# Patient Record
Sex: MALE | Race: BLACK OR AFRICAN AMERICAN | Employment: UNEMPLOYED | URBAN - METROPOLITAN AREA
[De-identification: names, ages, dates, MRNs, and addresses within clinical notes are randomized per-mention and may not be internally consistent; named-entity substitution may affect disease eponyms.]

---

## 2017-01-04 ENCOUNTER — OFFICE VISIT (OUTPATIENT)
Dept: FAMILY MEDICINE CLINIC | Age: 52
End: 2017-01-04

## 2017-01-04 VITALS
RESPIRATION RATE: 16 BRPM | HEIGHT: 77 IN | WEIGHT: 223 LBS | BODY MASS INDEX: 26.33 KG/M2 | OXYGEN SATURATION: 99 % | TEMPERATURE: 95.6 F | DIASTOLIC BLOOD PRESSURE: 86 MMHG | HEART RATE: 81 BPM | SYSTOLIC BLOOD PRESSURE: 151 MMHG

## 2017-01-04 DIAGNOSIS — B19.20 HEPATITIS C VIRUS INFECTION WITHOUT HEPATIC COMA, UNSPECIFIED CHRONICITY: ICD-10-CM

## 2017-01-04 DIAGNOSIS — Z00.00 REGULAR CHECK-UP: Primary | ICD-10-CM

## 2017-01-04 DIAGNOSIS — M54.40 LOW BACK PAIN WITH SCIATICA, SCIATICA LATERALITY UNSPECIFIED, UNSPECIFIED BACK PAIN LATERALITY, UNSPECIFIED CHRONICITY: ICD-10-CM

## 2017-01-04 LAB — GLUCOSE POC: 143 MG/DL

## 2017-01-04 RX ORDER — CYCLOBENZAPRINE HCL 10 MG
10 TABLET ORAL
Qty: 20 TAB | Refills: 0 | Status: SHIPPED | OUTPATIENT
Start: 2017-01-04 | End: 2017-02-24 | Stop reason: CLARIF

## 2017-01-04 RX ORDER — NAPROXEN 500 MG/1
500 TABLET ORAL 2 TIMES DAILY WITH MEALS
Qty: 60 TAB | Refills: 1 | Status: SHIPPED | OUTPATIENT
Start: 2017-01-04 | End: 2017-02-24 | Stop reason: CLARIF

## 2017-01-04 NOTE — PROGRESS NOTES
Chief Complaint   Patient presents with    Follow-up     pain in the lower back     1. Have you been to the ER, urgent care clinic since your last visit? Hospitalized since your last visit? Yes When: 12/2016 Where: [facility] Reason for visit: back pain    2. Have you seen or consulted any other health care providers outside of the [facility] since your last visit? No    3. When was your last Mammogram, Pap smear, and/or Colon screening? Mammogram: Year: No Pap smear: year: No Colonoscopy: Year:No  PMH/FH/Social Hx reviewed and updated as needed      Applicable screenings reviewed and updated as needed  Medication reconciliation performed. Patient does need medication refills. Flexeril, Neurontin, Tramadol  Health Maintenance reviewed.

## 2017-01-04 NOTE — PROGRESS NOTES
HPI  Carole Gaytan is a 45 y.o. male being seen today for needs pain medication. Pt woke up this morning and fell upon standing up due to legs giving. No back pain. Pt called 911 this morning and had to be seen by ambulance and missed court date this morning. Chief Complaint   Patient presents with    Follow-up     pain in the lower back   . he states that he has no money and still has a lot of back pain. On last tramadol pill. Does have hepatitis. States it takes him 30 minutes to urinate sometimes. Past Medical History   Diagnosis Date    BPH (benign prostatic hypertrophy)     [facility] [address]     Heroin abuse 1996     Stopped 3/2016    Hypertension     Prostatitis     Sinus drainage     Sinusitis nasal          ROS  Patient states that he is feeling well. Denies complaints of chest pain, shortness of breath, swelling of legs, dizziness or weakness. he denies nausea, vomiting or diarrhea. Current Outpatient Prescriptions   Medication Sig    HYDROcodone-acetaminophen (NORCO) 5-325 mg per tablet Take 1-2 tablets PO every 4-6 hours as needed for pain control. If over the counter ibuprofen or acetaminophen was suggested, then only take the vicodin for pain not well controlled with the over the counter medication.  naproxen (NAPROSYN) 500 mg tablet Take 1 Tab by mouth two (2) times daily (with meals).  doxazosin (CARDURA) 4 mg tablet Take 1 Tab by mouth daily.  traMADol (ULTRAM) 50 mg tablet Take 1 Tab by mouth every six (6) hours as needed for Pain. Max Daily Amount: 200 mg.  aspirin 81 mg chewable tablet Take 1 Tab by mouth daily.  gabapentin (NEURONTIN) 300 mg capsule Take 1 Cap by mouth nightly as needed.  polyethylene glycol (MIRALAX) 17 gram/dose powder Take 17 g by mouth daily.  cyclobenzaprine (FLEXERIL) 5 mg tablet Take 2 Tabs by mouth three (3) times daily. No current facility-administered medications for this visit.         PE  Visit Vitals    /86 (BP 1 Location: Left arm, BP Patient Position: Sitting)    Pulse 81    Temp 95.6 °F (35.3 °C) (Oral)    Resp 16    Ht 6' 5\" (1.956 m)    Wt 223 lb (101.2 kg)    SpO2 99%    BMI 26.44 kg/m2        Alert and oriented with normal mood and affect. he is well developed and well nourished . Lungs are clear without wheezing. Heart rate is regular without murmurs or gallops. There is no lower extremity edema. 5/5 strength both LE    Results for orders placed or performed in visit on 01/04/17   AMB POC GLUCOSE BLOOD, BY GLUCOSE MONITORING DEVICE   Result Value Ref Range    Glucose  mg/dL         Assessment and Plan:        ICD-10-CM ICD-9-CM    1. Regular check-up Z00.00 V70.0 AMB POC GLUCOSE BLOOD, BY GLUCOSE MONITORING DEVICE   2.  Hepatitis C virus infection without hepatic coma, unspecified chronicity B19.20 070.70    reviewed CTabd/pelvis  APA information  MRI rescheduled and pt strongly encouraged to follow through to determine plan of care    Marjorie Busby NP

## 2017-01-04 NOTE — PROGRESS NOTES
Discussed with the patient MRI protocol, scheduled MRI appointment, and reviewed Care Card application process  Gave patient AVS    Discussed with the patient and all questioned fully answered. He will call me if any problems arise.

## 2017-01-04 NOTE — MR AVS SNAPSHOT
Visit Information Date & Time Provider Department Dept. Phone Encounter #  
 1/4/2017  1:15 PM Zoila Durant [phone] [id number] Upcoming Health Maintenance Date Due Hepatitis C Screening 1965 Pneumococcal 19-64 Medium Risk (1 of 1 - PPSV23) 8/30/1983 DTaP/Tdap/Td series (1 - Tdap) 8/30/1985 FOBT Q 1 YEAR AGE 50-75 8/30/2014 INFLUENZA AGE 9 TO ADULT 8/1/2016 Allergies as of 1/4/2017  Review Complete On: 1/4/2017 By: Gabriella Driver Severity Noted Reaction Type Reactions Pollen Extracts  04/30/2013    Runny Nose Patient denies any allergies Current Immunizations  Never Reviewed No immunizations on file. Not reviewed this visit You Were Diagnosed With   
  
 Codes Comments Regular check-up    -  Primary ICD-10-CM: Z00.00 ICD-9-CM: V70.0 Hepatitis C virus infection without hepatic coma, unspecified chronicity     ICD-10-CM: B19.20 ICD-9-CM: 070.70 Low back pain with sciatica, sciatica laterality unspecified, unspecified back pain laterality, unspecified chronicity     ICD-10-CM: M54.40 ICD-9-CM: 724.3 Vitals BP Pulse Temp Resp Height(growth percentile) Weight(growth percentile) 151/86 (BP 1 Location: Left arm, BP Patient Position: Sitting) 81 95.6 °F (35.3 °C) (Oral) 16 6' 5\" (1.956 m) 223 lb (101.2 kg) SpO2 BMI Smoking Status 99% 26.44 kg/m2 Current Every Day Smoker Vitals History BMI and BSA Data Body Mass Index Body Surface Area  
 26.44 kg/m 2 2.34 m 2 Preferred Pharmacy Pharmacy Name Phone [id number] - [address] [phone] Your Updated Medication List  
  
   
This list is accurate as of: 1/4/17  1:16 PM.  Always use your most recent med list.  
  
  
  
  
 aspirin 81 mg chewable tablet Take 1 Tab by mouth daily. cyclobenzaprine 10 mg tablet Commonly known as:  FLEXERIL  
 Take 1 Tab by mouth three (3) times daily as needed for Muscle Spasm(s). doxazosin 4 mg tablet Commonly known as:  CARDURA Take 1 Tab by mouth daily. gabapentin 300 mg capsule Commonly known as:  NEURONTIN Take 1 Cap by mouth nightly as needed. HYDROcodone-acetaminophen 5-325 mg per tablet Commonly known as:  Trudi President Take 1-2 tablets PO every 4-6 hours as needed for pain control. If over the counter ibuprofen or acetaminophen was suggested, then only take the vicodin for pain not well controlled with the over the counter medication. * naproxen 500 mg tablet Commonly known as:  NAPROSYN Take 1 Tab by mouth two (2) times daily (with meals). * naproxen 500 mg tablet Commonly known as:  NAPROSYN Take 1 Tab by mouth two (2) times daily (with meals). polyethylene glycol 17 gram/dose powder Commonly known as:  Leata Sans Take 17 g by mouth daily. traMADol 50 mg tablet Commonly known as:  ULTRAM  
Take 1 Tab by mouth every six (6) hours as needed for Pain. Max Daily Amount: 200 mg.  
  
 * Notice: This list has 2 medication(s) that are the same as other medications prescribed for you. Read the directions carefully, and ask your doctor or other care provider to review them with you. Prescriptions Sent to Pharmacy Refills  
 cyclobenzaprine (FLEXERIL) 10 mg tablet 0 Sig: Take 1 Tab by mouth three (3) times daily as needed for Muscle Spasm(s). Class: Normal  
 Pharmacy: [id number] Ph #: [phone] Route: Oral  
 naproxen (NAPROSYN) 500 mg tablet 1 Sig: Take 1 Tab by mouth two (2) times daily (with meals). Class: Normal  
 Pharmacy: [address] [facility] Ph #: [phone] Route: Oral  
  
To-Do List   
 01/12/2017 4:00 PM  
  Appointment with [facility] MRI RM 1 at [facility]  Nicola Jack ([phone]) GENERAL INSTRUCTIONS  Bring information (ID card) if you have any medically implanted devices. You will be required to lie still while the procedure is being performed. Remove any jewelry (including body piercing, hairpins) prior to MRI. If you have had a creatinine level drawn within the past 30 days, please bring most recent results to your appt. Bring any films, CD's, and reports related to your study with you on the day of your exam.  This only includes studies done outside of [address] [place] [address], [facility], [place] [address], and [facility] [place] [facility]. Bring a complete list of all medications you are currently taking to include prescriptions, over-the-counter meds, herbals, vitamins & any dietary supplements. If you were given medications for claustrophobia or anxiety, please arrange to have someone drive you to your appointment. QUESTIONS  Notify the MRI Department if you have any questions concerning your study. Aleida Tovar [phone] [address] [place] - [phone] Patient Instructions Call [employer] Patient [employer] at [phone]. They will screen you for any insurance you might qualify for. This is the first step before you can receive discounts at the hospital or [facility] specialists. Additional contact numbers for APA are below: For [place]/[facility] patients: [phone] For [place]/[facility] patients: [phone] For [place] patients: [phone] Introducing [facility]! Mike Wasserman introduces [employer] patient portal. Now you can access parts of your medical record, email your doctor's office, and request medication refills online. 1. In your internet browser, go to [email or web address] 2. Click on the First Time User? Click Here link in the Sign In box. You will see the New Member Sign Up page. 3. Enter your [employer] Access Code exactly as it appears below.  You will not need to use this code after youve completed the sign-up process. If you do not sign up before the expiration date, you must request a new code. · [employer] Access Code: [email or web address] Expires: 1/11/2017 12:29 AM 
 
4. Enter the last four digits of your Social Security Number (xxxx) and Date of Birth (mm/dd/yyyy) as indicated and click Submit. You will be taken to the next sign-up page. 5. Create a [employer] ID. This will be your [employer] login ID and cannot be changed, so think of one that is secure and easy to remember. 6. Create a [employer] password. You can change your password at any time. 7. Enter your Password Reset Question and Answer. This can be used at a later time if you forget your password. 8. Enter your e-mail address. You will receive e-mail notification when new information is available in [address] 9. Click Sign Up. You can now view and download portions of your medical record. 10. Click the Download Summary menu link to download a portable copy of your medical information. If you have questions, please visit the Frequently Asked Questions section of the [employer] website. Remember, [employer] is NOT to be used for urgent needs. For medical emergencies, dial 911. Now available from your iPhone and Android! Please provide this summary of care documentation to your next provider. Your primary care clinician is listed as NONE. If you have any questions after today's visit, please call [phone].

## 2017-01-04 NOTE — PATIENT INSTRUCTIONS
Call [employer] Patient [employer]acy at [phone]. They will screen you for any insurance you might qualify for. This is the first step before you can receive discounts at the hospital or [facility] specialists. Additional contact numbers for APA are below:   For [place] patients: [phone]  For [place]/[facility] patients: [phone]  For [place]/[facility] patients: [phone]

## 2017-01-12 ENCOUNTER — HOSPITAL ENCOUNTER (OUTPATIENT)
Dept: MRI IMAGING | Age: 52
Discharge: HOME | End: 2017-01-12
Attending: NURSE PRACTITIONER
Payer: SELF-PAY

## 2017-01-12 DIAGNOSIS — M54.50 CHRONIC LEFT-SIDED LOW BACK PAIN WITHOUT SCIATICA: ICD-10-CM

## 2017-01-12 DIAGNOSIS — G89.29 CHRONIC LEFT-SIDED LOW BACK PAIN WITHOUT SCIATICA: ICD-10-CM

## 2017-01-12 PROCEDURE — 72148 MRI LUMBAR SPINE W/O DYE: CPT

## 2017-02-06 ENCOUNTER — HOSPITAL ENCOUNTER (EMERGENCY)
Age: 52
Discharge: HOME | End: 2017-02-07
Attending: EMERGENCY MEDICINE
Payer: SELF-PAY

## 2017-02-06 ENCOUNTER — APPOINTMENT (OUTPATIENT)
Dept: GENERAL RADIOLOGY | Age: 52
End: 2017-02-06
Attending: EMERGENCY MEDICINE
Payer: SELF-PAY

## 2017-02-06 DIAGNOSIS — J20.8 ACUTE BRONCHITIS DUE TO OTHER SPECIFIED ORGANISMS: Primary | ICD-10-CM

## 2017-02-06 LAB
ANION GAP BLD CALC-SCNC: 9 MMOL/L (ref 3–18)
BASOPHILS # BLD AUTO: 0 K/UL (ref 0–0.1)
BASOPHILS # BLD: 0 % (ref 0–2)
BUN SERPL-MCNC: 8 MG/DL (ref 7–18)
BUN/CREAT SERPL: 11 (ref 12–20)
CALCIUM SERPL-MCNC: 9 MG/DL (ref 8.5–10.1)
CHLORIDE SERPL-SCNC: 99 MMOL/L (ref 100–108)
CK MB CFR SERPL CALC: 0.7 % (ref 0–4)
CK MB SERPL-MCNC: 1.1 NG/ML (ref 0.5–3.6)
CK SERPL-CCNC: 169 U/L (ref 39–308)
CO2 SERPL-SCNC: 27 MMOL/L (ref 21–32)
CREAT SERPL-MCNC: 0.74 MG/DL (ref 0.6–1.3)
DIFFERENTIAL METHOD BLD: ABNORMAL
EOSINOPHIL # BLD: 0 K/UL (ref 0–0.4)
EOSINOPHIL NFR BLD: 0 % (ref 0–5)
ERYTHROCYTE [DISTWIDTH] IN BLOOD BY AUTOMATED COUNT: 14 % (ref 11.6–14.5)
FLUAV AG NPH QL IA: NEGATIVE
FLUBV AG NOSE QL IA: NEGATIVE
GLUCOSE SERPL-MCNC: 151 MG/DL (ref 74–99)
HCT VFR BLD AUTO: 40.1 % (ref 36–48)
HGB BLD-MCNC: 13.4 G/DL (ref 13–16)
LACTATE BLD-SCNC: 0.9 MMOL/L (ref 0.4–2)
LYMPHOCYTES # BLD AUTO: 14 % (ref 21–52)
LYMPHOCYTES # BLD: 1.3 K/UL (ref 0.9–3.6)
MCH RBC QN AUTO: 28 PG (ref 24–34)
MCHC RBC AUTO-ENTMCNC: 33.4 G/DL (ref 31–37)
MCV RBC AUTO: 83.9 FL (ref 74–97)
MONOCYTES # BLD: 0.8 K/UL (ref 0.05–1.2)
MONOCYTES NFR BLD AUTO: 9 % (ref 3–10)
NEUTS SEG # BLD: 7 K/UL (ref 1.8–8)
NEUTS SEG NFR BLD AUTO: 77 % (ref 40–73)
PLATELET # BLD AUTO: 200 K/UL (ref 135–420)
PMV BLD AUTO: 10.3 FL (ref 9.2–11.8)
POTASSIUM SERPL-SCNC: 3.6 MMOL/L (ref 3.5–5.5)
RBC # BLD AUTO: 4.78 M/UL (ref 4.7–5.5)
SODIUM SERPL-SCNC: 135 MMOL/L (ref 136–145)
TROPONIN I SERPL-MCNC: <0.02 NG/ML (ref 0–0.04)
TSH SERPL DL<=0.05 MIU/L-ACNC: 0.33 UIU/ML (ref 0.36–3.74)
WBC # BLD AUTO: 9.2 K/UL (ref 4.6–13.2)

## 2017-02-06 PROCEDURE — 93005 ELECTROCARDIOGRAM TRACING: CPT

## 2017-02-06 PROCEDURE — 74011000250 HC RX REV CODE- 250: Performed by: EMERGENCY MEDICINE

## 2017-02-06 PROCEDURE — 77030012341 HC CHMB SPCR OPTC MDI VYRM -A

## 2017-02-06 PROCEDURE — 87040 BLOOD CULTURE FOR BACTERIA: CPT | Performed by: EMERGENCY MEDICINE

## 2017-02-06 PROCEDURE — 83605 ASSAY OF LACTIC ACID: CPT

## 2017-02-06 PROCEDURE — 71020 XR CHEST PA LAT: CPT

## 2017-02-06 PROCEDURE — 74011250637 HC RX REV CODE- 250/637: Performed by: EMERGENCY MEDICINE

## 2017-02-06 PROCEDURE — 94640 AIRWAY INHALATION TREATMENT: CPT

## 2017-02-06 PROCEDURE — 82550 ASSAY OF CK (CPK): CPT | Performed by: EMERGENCY MEDICINE

## 2017-02-06 PROCEDURE — 84443 ASSAY THYROID STIM HORMONE: CPT | Performed by: EMERGENCY MEDICINE

## 2017-02-06 PROCEDURE — 85025 COMPLETE CBC W/AUTO DIFF WBC: CPT | Performed by: EMERGENCY MEDICINE

## 2017-02-06 PROCEDURE — 99284 EMERGENCY DEPT VISIT MOD MDM: CPT

## 2017-02-06 PROCEDURE — 80048 BASIC METABOLIC PNL TOTAL CA: CPT | Performed by: EMERGENCY MEDICINE

## 2017-02-06 PROCEDURE — 87804 INFLUENZA ASSAY W/OPTIC: CPT | Performed by: EMERGENCY MEDICINE

## 2017-02-06 RX ORDER — CODEINE PHOSPHATE AND GUAIFENESIN 10; 100 MG/5ML; MG/5ML
10 SOLUTION ORAL
Status: COMPLETED | OUTPATIENT
Start: 2017-02-06 | End: 2017-02-06

## 2017-02-06 RX ORDER — ACETAMINOPHEN 500 MG
500 TABLET ORAL
Status: COMPLETED | OUTPATIENT
Start: 2017-02-06 | End: 2017-02-06

## 2017-02-06 RX ORDER — ALBUTEROL SULFATE 90 UG/1
2 AEROSOL, METERED RESPIRATORY (INHALATION)
Status: DISCONTINUED | OUTPATIENT
Start: 2017-02-06 | End: 2017-02-07 | Stop reason: HOSPADM

## 2017-02-06 RX ORDER — CODEINE PHOSPHATE AND GUAIFENESIN 10; 100 MG/5ML; MG/5ML
10 SOLUTION ORAL
Qty: 180 ML | Refills: 0 | Status: SHIPPED | OUTPATIENT
Start: 2017-02-06 | End: 2017-02-24 | Stop reason: CLARIF

## 2017-02-06 RX ORDER — PREDNISONE 20 MG/1
60 TABLET ORAL
Status: COMPLETED | OUTPATIENT
Start: 2017-02-06 | End: 2017-02-07

## 2017-02-06 RX ORDER — PREDNISONE 50 MG/1
50 TABLET ORAL DAILY
Qty: 5 TAB | Refills: 0 | Status: SHIPPED | OUTPATIENT
Start: 2017-02-06 | End: 2017-02-11

## 2017-02-06 RX ADMIN — GUAIFENESIN AND CODEINE PHOSPHATE 10 ML: 100; 10 SOLUTION ORAL at 23:01

## 2017-02-06 RX ADMIN — ACETAMINOPHEN 500 MG: 500 TABLET, FILM COATED ORAL at 23:01

## 2017-02-06 RX ADMIN — FLUORESCEIN SODIUM 1 STRIP: 1 STRIP OPHTHALMIC at 23:01

## 2017-02-07 VITALS
HEART RATE: 89 BPM | SYSTOLIC BLOOD PRESSURE: 140 MMHG | TEMPERATURE: 99.9 F | RESPIRATION RATE: 18 BRPM | OXYGEN SATURATION: 96 % | WEIGHT: 220 LBS | HEIGHT: 77 IN | BODY MASS INDEX: 25.98 KG/M2 | DIASTOLIC BLOOD PRESSURE: 88 MMHG

## 2017-02-07 LAB
ATRIAL RATE: 99 BPM
CALCULATED P AXIS, ECG09: 64 DEGREES
CALCULATED R AXIS, ECG10: 42 DEGREES
CALCULATED T AXIS, ECG11: 51 DEGREES
DIAGNOSIS, 93000: NORMAL
P-R INTERVAL, ECG05: 152 MS
Q-T INTERVAL, ECG07: 328 MS
QRS DURATION, ECG06: 80 MS
QTC CALCULATION (BEZET), ECG08: 420 MS
VENTRICULAR RATE, ECG03: 99 BPM

## 2017-02-07 PROCEDURE — 74011250637 HC RX REV CODE- 250/637: Performed by: EMERGENCY MEDICINE

## 2017-02-07 PROCEDURE — 74011636637 HC RX REV CODE- 636/637: Performed by: EMERGENCY MEDICINE

## 2017-02-07 RX ADMIN — ALBUTEROL SULFATE 2 PUFF: 90 AEROSOL, METERED RESPIRATORY (INHALATION) at 00:29

## 2017-02-07 RX ADMIN — PREDNISONE 60 MG: 20 TABLET ORAL at 00:28

## 2017-02-07 NOTE — ED PROVIDER NOTES
HPI Comments: 9:15 PM Mary Small is a 45 y.o. male occasional smoker, [facility], with a hx of chronic back pain, HTN, prostatitis, and diabetes who presents to the ED c/o chest congestion that started yesterday but worsened this morning. He notes associated chills, headache, generalized weakness, and a productive cough with a yellow sputum. He has not checked his temperature. He has been taking OTC Tussin with no relief. He took a left over Vicodin yesterday with no relief. He does report a hx of asthma as a child, but it went away as he grew up. 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 The pt denies abdominal pain, N/V/D, shortness of breath, and any further complaints. The history is provided by the patient. Past Medical History:   Diagnosis Date    BPH (benign prostatic hypertrophy)     Diabetes ([facility] [address]     Heroin abuse 1996     Stopped 3/2016    Hypertension     Prostatitis     Sinus drainage     Sinusitis nasal        History reviewed. No pertinent past surgical history. Family History:   Problem Relation Age of Onset    Heart Disease Mother     Hypertension Mother     Diabetes Father     Cancer Father     Hypertension Sister        Social History     Social History    Marital status: LEGALLY      Spouse name: N/A    Number of children: N/A    Years of education: N/A     Occupational History    Not on file.      Social History Main Topics    Smoking status: Current Every Day Smoker     Packs/day: 0.50     Years: 20.00     Types: Cigarettes    Smokeless tobacco: Never Used      Comment: 4 cigarettes a day    Alcohol use 1.5 oz/week     3 Cans of beer per week      Comment: occassiionally    Drug use: No      Comment: last use 3/2016- entered drug program    Sexual activity: Yes     Partners: Female     Birth control/ protection: None     Other Topics Concern    Not on file     Social History Narrative ALLERGIES: Pollen extracts    Review of Systems   Constitutional: Positive for chills. Negative for activity change, appetite change and diaphoresis. HENT: Positive for congestion (chest). Negative for dental problem, ear pain, hearing loss, nosebleeds, postnasal drip, sinus pressure, sneezing and tinnitus. Eyes: Negative for photophobia, discharge, redness and visual disturbance. Respiratory: Positive for cough. Negative for choking, shortness of breath, wheezing and stridor. Cardiovascular: Negative for chest pain, palpitations and leg swelling. Gastrointestinal: Negative for abdominal distention, abdominal pain, anal bleeding, blood in stool, diarrhea, nausea and vomiting. Genitourinary: Negative for decreased urine volume, difficulty urinating, discharge, dysuria, frequency, hematuria, penile swelling, scrotal swelling, testicular pain and urgency. Musculoskeletal: Negative for arthralgias, back pain, gait problem, joint swelling, myalgias and neck pain. Skin: Negative for color change and pallor. Neurological: Positive for weakness and headaches. Negative for dizziness, tremors, seizures and syncope. Hematological: Negative for adenopathy. Does not bruise/bleed easily. Psychiatric/Behavioral: Negative for agitation, behavioral problems, confusion and hallucinations. The patient is not nervous/anxious. Vitals:    02/06/17 2011   BP: 144/77   Pulse: (!) 113   Resp: 19   Temp: (!) 101.9 °F (38.8 °C)   SpO2: 94%   Weight: 99.8 kg (220 lb)   Height: 6' 5\" (1.956 m)            Physical Exam   Constitutional: He is oriented to person, place, and time. He appears well-developed and well-nourished. HENT:   Head: Normocephalic and atraumatic. Right Ear: External ear normal.   Left Ear: External ear normal.   Nose: Nose normal.   Mouth/Throat: Oropharynx is clear and moist.   Eyes: Conjunctivae and EOM are normal. Pupils are equal, round, and reactive to light.  Right eye exhibits no discharge. No scleral icterus. Neck: Normal range of motion. Neck supple. No JVD present. No thyromegaly present. Cardiovascular: Normal rate, regular rhythm and intact distal pulses. Exam reveals no gallop and no friction rub. No murmur heard. Pulmonary/Chest: No respiratory distress. He has no wheezes. He has rhonchi (scattered bilaterally). He has no rales. He exhibits no tenderness. Frequent cough   Abdominal: He exhibits no distension and no mass. There is no tenderness. There is no rebound and no guarding. Musculoskeletal: Normal range of motion. He exhibits no edema. Lymphadenopathy:     He has no cervical adenopathy. Neurological: He is alert and oriented to person, place, and time. No cranial nerve deficit. Coordination normal.   Skin: Skin is warm and dry. No rash noted. No erythema. Psychiatric: He has a normal mood and affect. His behavior is normal. Judgment normal.   Nursing note and vitals reviewed. MDM  Number of Diagnoses or Management Options  Acute bronchitis due to other specified organisms:   Diagnosis management comments: 45year old male, one day history of fever with cough, sputum production. DDx infection; bacterial , viral , other.  Will trend labs, administer fluids follow       Amount and/or Complexity of Data Reviewed  Clinical lab tests: ordered and reviewed  Tests in the radiology section of CPT®: ordered and reviewed    Risk of Complications, Morbidity, and/or Mortality  Presenting problems: moderate    Patient Progress  Patient progress: improved    ED Course       Procedures  Vitals:  Patient Vitals for the past 12 hrs:   Temp Pulse Resp BP SpO2   02/06/17 2011 (!) 101.9 °F (38.8 °C) (!) 113 19 144/77 94 %   Pulse ox reviewed      Medications ordered:   Medications   predniSONE (DELTASONE) tablet 60 mg (not administered)   albuterol (PROVENTIL HFA, VENTOLIN HFA, PROAIR HFA) inhaler 2 Puff (not administered)   fluorescein (FUL-ASHLEY) 1 mg ophthalmic strip 1 Strip (1 Strip Both Eyes Given by Provider 2/6/17 2301)   acetaminophen (TYLENOL) tablet 500 mg (500 mg Oral Given 2/6/17 2301)   guaiFENesin-codeine (ROBITUSSIN AC) 100-10 mg/5 mL solution 10 mL (10 mL Oral Given 2/6/17 2301)         Lab findings:  Recent Results (from the past 12 hour(s))   POC LACTIC ACID    Collection Time: 02/06/17 10:02 PM   Result Value Ref Range    Lactic Acid (POC) 0.9 0.4 - 2.0 mmol/L   CBC WITH AUTOMATED DIFF    Collection Time: 02/06/17 10:07 PM   Result Value Ref Range    WBC 9.2 4.6 - 13.2 K/uL    RBC 4.78 4.70 - 5.50 M/uL    HGB 13.4 13.0 - 16.0 g/dL    HCT 40.1 36.0 - 48.0 %    MCV 83.9 74.0 - 97.0 FL    MCH 28.0 24.0 - 34.0 PG    MCHC 33.4 31.0 - 37.0 g/dL    RDW 14.0 11.6 - 14.5 %    PLATELET 036 417 - 935 K/uL    MPV 10.3 9.2 - 11.8 FL    NEUTROPHILS 77 (H) 40 - 73 %    LYMPHOCYTES 14 (L) 21 - 52 %    MONOCYTES 9 3 - 10 %    EOSINOPHILS 0 0 - 5 %    BASOPHILS 0 0 - 2 %    ABS. NEUTROPHILS 7.0 1.8 - 8.0 K/UL    ABS. LYMPHOCYTES 1.3 0.9 - 3.6 K/UL    ABS. MONOCYTES 0.8 0.05 - 1.2 K/UL    ABS. EOSINOPHILS 0.0 0.0 - 0.4 K/UL    ABS.  BASOPHILS 0.0 0.0 - 0.1 K/UL    DF AUTOMATED     METABOLIC PANEL, BASIC    Collection Time: 02/06/17 10:07 PM   Result Value Ref Range    Sodium 135 (L) 136 - 145 mmol/L    Potassium 3.6 3.5 - 5.5 mmol/L    Chloride 99 (L) 100 - 108 mmol/L    CO2 27 21 - 32 mmol/L    Anion gap 9 3.0 - 18 mmol/L    Glucose 151 (H) 74 - 99 mg/dL    BUN 8 7.0 - 18 MG/DL    Creatinine 0.74 0.6 - 1.3 MG/DL    BUN/Creatinine ratio 11 (L) 12 - 20      GFR est AA >60 >60 ml/min/1.73m2    GFR est non-AA >60 >60 ml/min/1.73m2    Calcium 9.0 8.5 - 10.1 MG/DL   CARDIAC PANEL,(CK, CKMB & TROPONIN)    Collection Time: 02/06/17 10:07 PM   Result Value Ref Range     39 - 308 U/L    CK - MB 1.1 0.5 - 3.6 ng/ml    CK-MB Index 0.7 0.0 - 4.0 %    Troponin-I, Qt. <0.02 0.0 - 0.045 NG/ML   INFLUENZA A & B AG (RAPID TEST)    Collection Time: 02/06/17 10:40 PM   Result Value Ref Range Influenza A Antigen NEGATIVE  NEG      Influenza B Antigen NEGATIVE  NEG         EKG interpretation by ED Physician:  Normal sinus rhythm, rate 99BPM, no STEMI, per Dr. Hitesh Staples 2246    X-Ray, CT or other radiology findings or impressions:  XR CHEST PA LAT        No acute process    Per Hitesh Jack       Progress notes, Consult notes or additional Procedure notes:   11:19 PM Pt reevaluated at this time and is resting comfortably in NAD. Discussed results and findings, as well as, diagnosis and plan for discharge. Pt verbalizes understanding and agreement with plan. All questions addressed at this time. Disposition:  Diagnosis:   1. Acute bronchitis due to other specified organisms        Disposition:Discharge       Patient's Medications   Start Taking    GUAIFENESIN-CODEINE (CHERATUSSIN AC) 100-10 MG/5 ML SOLUTION    Take 10 mL by mouth three (3) times daily as needed for Cough. Max Daily Amount: 30 mL. PREDNISONE (DELTASONE) 50 MG TABLET    Take 1 Tab by mouth daily for 5 days. Continue Taking    ASPIRIN 81 MG CHEWABLE TABLET    Take 1 Tab by mouth daily. CYCLOBENZAPRINE (FLEXERIL) 10 MG TABLET    Take 1 Tab by mouth three (3) times daily as needed for Muscle Spasm(s). DOXAZOSIN (CARDURA) 4 MG TABLET    Take 1 Tab by mouth daily. GABAPENTIN (NEURONTIN) 300 MG CAPSULE    Take 1 Cap by mouth nightly as needed. HYDROCODONE-ACETAMINOPHEN (NORCO) 5-325 MG PER TABLET    Take 1-2 tablets PO every 4-6 hours as needed for pain control. If over the counter ibuprofen or acetaminophen was suggested, then only take the vicodin for pain not well controlled with the over the counter medication. NAPROXEN (NAPROSYN) 500 MG TABLET    Take 1 Tab by mouth two (2) times daily (with meals). NAPROXEN (NAPROSYN) 500 MG TABLET    Take 1 Tab by mouth two (2) times daily (with meals). POLYETHYLENE GLYCOL (MIRALAX) 17 GRAM/DOSE POWDER    Take 17 g by mouth daily.     TRAMADOL (ULTRAM) 50 MG TABLET    Take 1 Tab by mouth every six (6) hours as needed for Pain. Max Daily Amount: 200 mg. These Medications have changed    No medications on file   Stop Taking    No medications on file         SCRIBE ATTESTATION STATEMENT  Documented by: Dorothy Srinivasan for, and in the presence of, Yazan Gold MD 9:35 PM     Signed by: Sara Hull, 02/06/17 9:35 PM        PROVIDER ATTESTATION STATEMENT  I personally performed the services described in the documentation, reviewed the documentation, as recorded by the scribe in my presence, and it accurately and completely records my words and actions.   Yazan Gold MD

## 2017-02-07 NOTE — DISCHARGE INSTRUCTIONS
Bronchitis: Care Instructions  Your Care Instructions    Bronchitis is inflammation of the bronchial tubes, which carry air to the lungs. The tubes swell and produce mucus, or phlegm. The mucus and inflamed bronchial tubes make you cough. You may have trouble breathing. Most cases of bronchitis are caused by viruses like those that cause colds. Antibiotics usually do not help and they may be harmful. Bronchitis usually develops rapidly and lasts about 2 to 3 weeks in otherwise healthy people. Follow-up care is a key part of your treatment and safety. Be sure to make and go to all appointments, and call your doctor if you are having problems. It's also a good idea to know your test results and keep a list of the medicines you take. How can you care for yourself at home? · Take all medicines exactly as prescribed. Call your doctor if you think you are having a problem with your medicine. · Get some extra rest.  · Take an over-the-counter pain medicine, such as acetaminophen (Tylenol), ibuprofen (Advil, Motrin), or naproxen (Aleve) to reduce fever and relieve body aches. Read and follow all instructions on the label. · Do not take two or more pain medicines at the same time unless the doctor told you to. Many pain medicines have acetaminophen, which is Tylenol. Too much acetaminophen (Tylenol) can be harmful. · Take an over-the-counter cough medicine that contains dextromethorphan to help quiet a dry, hacking cough so that you can sleep. Avoid cough medicines that have more than one active ingredient. Read and follow all instructions on the label. · Breathe moist air from a humidifier, hot shower, or sink filled with hot water. The heat and moisture will thin mucus so you can cough it out. · Do not smoke. Smoking can make bronchitis worse. If you need help quitting, talk to your doctor about stop-smoking programs and medicines. These can increase your chances of quitting for good.   When should you call for help? Call 911 anytime you think you may need emergency care. For example, call if:  · You have severe trouble breathing. Call your doctor now or seek immediate medical care if:  · You have new or worse trouble breathing. · You cough up dark brown or bloody mucus (sputum). · You have a new or higher fever. · You have a new rash. Watch closely for changes in your health, and be sure to contact your doctor if:  · You cough more deeply or more often, especially if you notice more mucus or a change in the color of your mucus. · You are not getting better as expected. Where can you learn more? Go to http://carolyn-cullen.info/. Enter H333 in the search box to learn more about \"Bronchitis: Care Instructions. \"  Current as of: May 23, 2016  Content Version: 11.1  © 9940-8156 [employer]. Care instructions adapted under license by [employer] (which disclaims liability or warranty for this information). If you have questions about a medical condition or this instruction, always ask your healthcare professional. [address] any warranty or liability for your use of this information.

## 2017-02-07 NOTE — ED TRIAGE NOTES
Patient present to ED with complaints of flue like symptoms since Thursday, generalized pain and nausea.

## 2017-02-07 NOTE — ED NOTES
I performed a brief evaluation, including history and physical, of the patient here in triage and I have determined that pt will need further treatment and evaluation from the FT provider. I have placed initial orders to help in expediting patients care.      February 06, 2017 at 8:30 PM - Lizbeth Nunez MD        Visit Vitals    /77 (BP 1 Location: Left arm, BP Patient Position: Sitting)    Pulse (!) 113    Temp (!) 101.9 °F (38.8 °C)    Resp 19    Ht 6' 5\" (1.956 m)    Wt 99.8 kg (220 lb)    SpO2 94%    BMI 26.09 kg/m2

## 2017-02-08 ENCOUNTER — PATIENT OUTREACH (OUTPATIENT)
Dept: FAMILY MEDICINE CLINIC | Age: 52
End: 2017-02-08

## 2017-02-08 NOTE — LETTER
2/9/2017 12:01 PM 
 
Mr. Naveen Varela [address] [facility] [place] Varela I am a Nurse Navigator working with Brian Crisostomo NP. Part of my job is to follow up with patients who have been in the emergency department to see how they are feeling, answer any questions they may have about their visit and also make sure they follow-up with their primary care doctor. I have been unable to reach you by telephone and wanted to make sure that you  follow-up with Mrs Abhijeet Collazo about your recent visit to the hospital. I am enclosing the [facility] schedule. In the meantime, if you have any questions or concerns, please feel free to call me on my direct line at [phone]. Thank you for allowing us to participate in your care!  
 
 
 
Sincerely, 
 
 
Simeon Krishnamurthy RN

## 2017-02-12 LAB
BACTERIA SPEC CULT: NORMAL
BACTERIA SPEC CULT: NORMAL
SERVICE CMNT-IMP: NORMAL
SERVICE CMNT-IMP: NORMAL

## 2017-02-17 ENCOUNTER — TELEPHONE (OUTPATIENT)
Dept: FAMILY MEDICINE CLINIC | Age: 52
End: 2017-02-17

## 2017-02-17 NOTE — TELEPHONE ENCOUNTER
Pt called to get his MRI results. Read result letter sent by provider on 1/13/17. Pt agrees to physical therapy. Will notify provider.

## 2017-02-18 ENCOUNTER — TELEPHONE (OUTPATIENT)
Dept: FAMILY MEDICINE CLINIC | Age: 52
End: 2017-02-18

## 2017-02-24 ENCOUNTER — HOSPITAL ENCOUNTER (EMERGENCY)
Age: 52
Discharge: HOME HEALTH | End: 2017-02-24
Attending: EMERGENCY MEDICINE
Payer: SELF-PAY

## 2017-02-24 VITALS
SYSTOLIC BLOOD PRESSURE: 128 MMHG | DIASTOLIC BLOOD PRESSURE: 80 MMHG | HEIGHT: 77 IN | OXYGEN SATURATION: 98 % | HEART RATE: 82 BPM | TEMPERATURE: 97.7 F | WEIGHT: 230 LBS | RESPIRATION RATE: 16 BRPM | BODY MASS INDEX: 27.16 KG/M2

## 2017-02-24 DIAGNOSIS — S33.5XXA SPRAIN OF LUMBAR REGION, INITIAL ENCOUNTER: Primary | ICD-10-CM

## 2017-02-24 DIAGNOSIS — Z72.0 TOBACCO ABUSE: ICD-10-CM

## 2017-02-24 PROCEDURE — 99283 EMERGENCY DEPT VISIT LOW MDM: CPT

## 2017-02-24 PROCEDURE — 74011250637 HC RX REV CODE- 250/637: Performed by: PHYSICIAN ASSISTANT

## 2017-02-24 RX ORDER — OXYCODONE AND ACETAMINOPHEN 5; 325 MG/1; MG/1
1 TABLET ORAL
Status: COMPLETED | OUTPATIENT
Start: 2017-02-24 | End: 2017-02-24

## 2017-02-24 RX ORDER — OXYCODONE AND ACETAMINOPHEN 5; 325 MG/1; MG/1
1 TABLET ORAL
Qty: 20 TAB | Refills: 0 | Status: SHIPPED | OUTPATIENT
Start: 2017-02-24

## 2017-02-24 RX ORDER — CYCLOBENZAPRINE HCL 10 MG
10 TABLET ORAL
Qty: 20 TAB | Refills: 0 | Status: SHIPPED | OUTPATIENT
Start: 2017-02-24

## 2017-02-24 RX ORDER — CYCLOBENZAPRINE HCL 10 MG
10 TABLET ORAL
Status: COMPLETED | OUTPATIENT
Start: 2017-02-24 | End: 2017-02-24

## 2017-02-24 RX ADMIN — CYCLOBENZAPRINE HYDROCHLORIDE 10 MG: 10 TABLET, FILM COATED ORAL at 12:30

## 2017-02-24 RX ADMIN — OXYCODONE HYDROCHLORIDE AND ACETAMINOPHEN 1 TABLET: 5; 325 TABLET ORAL at 12:30

## 2017-02-24 NOTE — ED NOTES
Medication for pain given, discharge instructions with prescriptions x 2 were reviewed with patient who verbalized understanding. Family member driving patient home.

## 2017-02-24 NOTE — DISCHARGE INSTRUCTIONS
POWWOW Activation    Thank you for requesting access to POWWOW. Please follow the instructions below to securely access and download your online medical record. POWWOW allows you to send messages to your doctor, view your test results, renew your prescriptions, schedule appointments, and more. How Do I Sign Up? 1. In your internet browser, go to [email or web address]  2. Click on the First Time User? Click Here link in the Sign In box. You will be redirect to the New Member Sign Up page. 3. Enter your POWWOW Access Code exactly as it appears below. You will not need to use this code after youve completed the sign-up process. If you do not sign up before the expiration date, you must request a new code. POWWOW Access Code: [email or web address]  Expires: 2017  9:53 AM (This is the date your POWWOW access code will )    4. Enter the last four digits of your Social Security Number (xxxx) and Date of Birth (mm/dd/yyyy) as indicated and click Submit. You will be taken to the next sign-up page. 5. Create a POWWOW ID. This will be your POWWOW login ID and cannot be changed, so think of one that is secure and easy to remember. 6. Create a POWWOW password. You can change your password at any time. 7. Enter your Password Reset Question and Answer. This can be used at a later time if you forget your password. 8. Enter your e-mail address. You will receive e-mail notification when new information is available in [address]. 9. Click Sign Up. You can now view and download portions of your medical record. 10. Click the Download Summary menu link to download a portable copy of your medical information. Additional Information    If you have questions, please visit the Frequently Asked Questions section of the POWWOW website at [email or web address] Remember, POWWOW is NOT to be used for urgent needs. For medical emergencies, dial 911.

## 2017-02-24 NOTE — LETTER
NOTIFICATION RETURN TO WORK / SCHOOL 
 
2/24/2017 12:06 PM 
 
Mr. Cinthya Beckford [address] [place] [address] To Whom It May Concern: 
 
Cinthya Beckford is currently under the care of [facility] EMERGENCY DEPT. He will return to work/school on: 2/27/17 If there are questions or concerns please have the patient contact our office.  
 
 
 
Sincerely, 
 
 
Winifred Naylor

## 2017-02-24 NOTE — LETTER
[facility] EMERGENCY DEPT 
[address] [facility] [address] 
[phone] Work/School Note Date: 2/24/2017 To Whom It May concern: 
 
Ricardo Putnam was seen and treated today in the emergency room by the following provider(s): 
Attending Provider: Johanna Sahu MD 
Physician Assistant: LISA Hernández. Ricardo Putnam may return to school in 3 days. No lifting/pushing/pulling > 10 lbs x 1 week.  
 
Sincerely, 
 
 
 
 
LISA Hernández

## 2017-02-24 NOTE — ED PROVIDER NOTES
HPI Comments: 12:01 PM Mauricio Pereira is a 45 y.o. , normal built male with h/o HTN, BPH, diabetes, chronic back pain and Heroin abuse who presents to ED complaining of chronic L sided back pain onset 7 to 8 months ago. He denies the pain shooting or radiating down the legs. Pt denies loss of control of the bladder or bowels. Pt was last seen in [facility] Ed in November 2016 for the back pain. Pt has been taking Aspirin for his back pain the past several months on a daily basis w/o relief. No other concerns nor complaints at this time. PCP: Gabriela Bang NP      The history is provided by the patient. Past Medical History:   Diagnosis Date    BPH (benign prostatic hypertrophy)     Diabetes ([facility] [address]     Heroin abuse 1996    Stopped 3/2016    Hypertension     Prostatitis     Sinus drainage     Sinusitis nasal        No past surgical history on file. Family History:   Problem Relation Age of Onset    Heart Disease Mother     Hypertension Mother     Diabetes Father     Cancer Father     Hypertension Sister        Social History     Social History    Marital status: LEGALLY      Spouse name: N/A    Number of children: N/A    Years of education: N/A     Occupational History    Not on file. Social History Main Topics    Smoking status: Current Every Day Smoker     Packs/day: 0.50     Years: 20.00     Types: Cigarettes    Smokeless tobacco: Never Used      Comment: 4 cigarettes a day    Alcohol use 1.5 oz/week     3 Cans of beer per week      Comment: occassiionally    Drug use: No      Comment: last use 3/2016- entered drug program    Sexual activity: Yes     Partners: Female     Birth control/ protection: None     Other Topics Concern    Not on file     Social History Narrative         ALLERGIES: Pollen extracts    Review of Systems   Constitutional: Negative for chills and fever. HENT: Negative for congestion, ear pain, rhinorrhea and sore throat. Eyes: Negative for pain and discharge. Respiratory: Negative for cough, chest tightness and shortness of breath. Cardiovascular: Negative for chest pain, palpitations and leg swelling. Gastrointestinal: Negative for abdominal pain, diarrhea, nausea and vomiting. Genitourinary: Negative for decreased urine volume, difficulty urinating, dysuria, flank pain, frequency, hematuria and urgency. Musculoskeletal: Positive for back pain (L>R). Negative for arthralgias, joint swelling, neck pain and neck stiffness. Skin: Negative for color change, pallor, rash and wound. Neurological: Negative for dizziness, syncope, weakness, light-headedness, numbness and headaches. Psychiatric/Behavioral: Negative for behavioral problems. The patient is not nervous/anxious. Vitals:    02/24/17 1042   BP: 134/83   Pulse: 82   Resp: 16   Temp: 97.7 °F (36.5 °C)   SpO2: 97%   Weight: 104.3 kg (230 lb)   Height: 6' 5\" (1.956 m)            Physical Exam   Constitutional: He is oriented to person, place, and time. He appears well-developed and well-nourished. No distress. Cardiovascular: Normal rate, regular rhythm, normal heart sounds and intact distal pulses. Exam reveals no gallop and no friction rub. No murmur heard. Pulmonary/Chest: Effort normal and breath sounds normal. He exhibits no tenderness. Abdominal: Soft. Bowel sounds are normal. He exhibits no distension. There is no tenderness. There is no rebound and no guarding. Musculoskeletal: Normal range of motion. He exhibits tenderness (L paraspinal tenderness, no midline tenderness, normal gait). He exhibits no edema. Right hip: Normal.        Left hip: Normal.        Cervical back: Normal.        Thoracic back: Normal.        Lumbar back: He exhibits tenderness, pain and spasm. He exhibits normal range of motion, no bony tenderness, no swelling, no edema, no deformity, no laceration and normal pulse. Lumbar: Normal inspection.  No midline TTP. + Paraspinal muscle TTP L>R. FROM w/ pain on flexion/extension. MS 5/5 BUE/BLE. Gait Normal.    Neurological: He is alert and oriented to person, place, and time. He has normal strength. He is not disoriented. No sensory deficit. Coordination normal.   Gait normal, MS 5/5 BUE/BLE. Skin: Skin is warm, dry and intact. No rash noted. No cyanosis. No pallor. Psychiatric: He has a normal mood and affect. Nursing note and vitals reviewed. MDM  Number of Diagnoses or Management Options  Sprain of lumbar region, initial encounter: new and requires workup  Tobacco abuse: new and requires workup  Diagnosis management comments: DDX: Sciatica, Lumbar radiculopathy, Back Sprain, Back Strain, DJD, HNP, Epidural abscess, Cauda Equina Syndrome, Contusion, Pyelonephritis, Renal Colic, Rib Fracture, Rib Contusion, Costochondritis, Pleurisy, Pleurodynia, Pneumothorax, Thoracic Aneurysm, Abdominal Aortic Aneurysm, Nephro-ureteral Calculus, Acute Myocardial Infarction. Reviewed workup results, any meds, and discharge instructions OR admission plan with patient and any family present. Answered all questions. LISA Padilla  12:15 PM    PLEASE FOLLOW-UP AS DIRECTED WITHOUT FAIL WITHIN THE TIME FRAME RECOMMENDED AS FAILURE TO DO SO COULD RESULT IN WORSENING OF YOUR PHYSICAL CONDITION, DEATH, AND OR PERMANENT DISABILITY. RETURN TO THE EMERGENCY DEPARTMENT AT IF YOU ARE UNABLE TO FOLLOW-UP AS DIRECTED. RETURN TO THE EMERGENCY DEPARTMENT AT ONCE IF YOU HAVE SYMPTOMS THAT DO NOT IMPROVE WITH TREATMENT, NEW SYMPTOMS, WORSENING SYMPTOMS, OR ANY OTHER CONCERNS. THE PATIENT AGREES WITH THE DISCHARGE PLAN AND FOLLOW-UP INSTRUCTIONS. THE PATIENT AGREES TO REVIEW ALL HANDOUTS.           Amount and/or Complexity of Data Reviewed  Decide to obtain previous medical records or to obtain history from someone other than the patient: yes  Review and summarize past medical records: yes    Risk of Complications, Morbidity, and/or Mortality  Presenting problems: moderate  Diagnostic procedures: low  Management options: moderate    Patient Progress  Patient progress: stable      Procedures    Scribe Attestation  Shiela Mukhejree scribing for and in the presence of Javi Naylor (02/24/17/ 12:00 PM)    Physician Attestation  I personally performed the services described in this documentation, reviewed, and edited the documentation which was dictated to the scribe in my presence, and it accurately records my own words and actions. Javi Naylor (02/24/17/ 12:00 PM)    Signed by: Sara Reid, 02/24/17, 12:00 PM    Diagnosis:   1. Sprain of lumbar region, initial encounter    2. Tobacco abuse          Disposition: HOME    Follow-up Information     Follow up With Details Comments Contact Info    [facility] EMERGENCY DEPT  As needed, If symptoms worsen [address] [facility] [address] [place], NP Call today Schedule appointment to be seen within 3-4 days without fail for re-evaluation!   [facility] 85 Call today Orthopedics: Schedule appointment to be seen within 3-5 days for further evaluation without fail. [address] [facility], [address] [facility]  [phone]          Patient's Medications   Start Taking    CYCLOBENZAPRINE (FLEXERIL) 10 MG TABLET    Take 1 Tab by mouth three (3) times daily as needed for Muscle Spasm(s). Indications: MUSCLE SPASM    OXYCODONE-ACETAMINOPHEN (PERCOCET) 5-325 MG PER TABLET    Take 1 Tab by mouth every four (4) hours as needed (BREAKTHROUGH PAIN ONLY, DO NOT FILL UNLESS FLEXERIL IS FILLED.). Max Daily Amount: 6 Tabs. Continue Taking    ASPIRIN 81 MG CHEWABLE TABLET    Take 1 Tab by mouth daily. DOXAZOSIN (CARDURA) 4 MG TABLET    Take 1 Tab by mouth daily. GABAPENTIN (NEURONTIN) 300 MG CAPSULE    Take 1 Cap by mouth nightly as needed.     POLYETHYLENE GLYCOL (MIRALAX) 17 GRAM/DOSE POWDER    Take 17 g by mouth daily. These Medications have changed    No medications on file   Stop Taking    No medications on file       No results found for this or any previous visit (from the past 24 hour(s)).

## 2017-02-24 NOTE — ED NOTES
Patient was given a discount from [employer] [facility] to save on his medications that were prescribed by the Ed doctor.

## 2017-02-28 ENCOUNTER — PATIENT OUTREACH (OUTPATIENT)
Dept: FAMILY MEDICINE CLINIC | Age: 52
End: 2017-02-28

## 2017-02-28 NOTE — PROGRESS NOTES
ED Follow Up Call    Called patient on 2/28/17 for discharge from [facility] on 2/24/17 for Sprain of lumbar region and tobacco abuse. Patient reports the percocet and flexeril prescribed by the ED are helping. Pt still has not completed his application for financial assistance with [employer]. Pt needs a form from unemployment and for his wife to sign part of the application. Pt states he is currently working on this. Pt was instructed to f/u with orthopedics after discharge but pt has decided to try PT first. Pt would like to go ahead and pursue physical therapy before he gets financial assistance. Will notify provider to place order. Discussed and reviewed ED discharge plan with patient. Patient has my number to call if questions arise.

## 2017-03-13 ENCOUNTER — TELEPHONE (OUTPATIENT)
Dept: FAMILY MEDICINE CLINIC | Age: 52
End: 2017-03-13

## 2017-03-13 NOTE — TELEPHONE ENCOUNTER
Called patient to discuss Physical Therapy referral. He declined service (3/10/17)/apptoinment at  [employer] . Unable to leave message.

## 2017-03-17 ENCOUNTER — PATIENT OUTREACH (OUTPATIENT)
Dept: FAMILY MEDICINE CLINIC | Age: 52
End: 2017-03-17

## 2017-03-17 NOTE — LETTER
3/21/2017 9:29 AM 
 
Mr. Te Hamlin [address] [facility] Dear Te Hamlin I am the Nurse Navigator working with the [address] [place] [address]. I have been unable to reach you by phone. I would like to follow up with you about your back pain when you have some time. You can reach me directly at [phone]. Thank you for allowing us to participate in your care!  
 
 
 
Sincerely, 
 
 
Db Rausch RN

## 2017-03-20 NOTE — PROGRESS NOTES
Transitions of Care follow up for Sprain of lumbar region and tobacco abuse. Pt declined physical therapy. Unable to reach patient by phone. Letter mailed to pt.

## 2017-04-12 ENCOUNTER — PATIENT OUTREACH (OUTPATIENT)
Dept: FAMILY MEDICINE CLINIC | Age: 52
End: 2017-04-12

## 2017-04-30 ENCOUNTER — APPOINTMENT (OUTPATIENT)
Dept: GENERAL RADIOLOGY | Age: 52
End: 2017-04-30
Attending: PHYSICIAN ASSISTANT
Payer: SELF-PAY

## 2017-04-30 ENCOUNTER — HOSPITAL ENCOUNTER (EMERGENCY)
Age: 52
Discharge: HOME | End: 2017-04-30
Attending: EMERGENCY MEDICINE
Payer: SELF-PAY

## 2017-04-30 VITALS
HEART RATE: 95 BPM | BODY MASS INDEX: 27.75 KG/M2 | HEIGHT: 77 IN | TEMPERATURE: 97.4 F | RESPIRATION RATE: 18 BRPM | DIASTOLIC BLOOD PRESSURE: 83 MMHG | OXYGEN SATURATION: 98 % | WEIGHT: 235 LBS | SYSTOLIC BLOOD PRESSURE: 134 MMHG

## 2017-04-30 DIAGNOSIS — S93.409A SPRAIN OF ANKLE, UNSPECIFIED LATERALITY, UNSPECIFIED LIGAMENT, INITIAL ENCOUNTER: ICD-10-CM

## 2017-04-30 DIAGNOSIS — S83.92XA SPRAIN OF LEFT KNEE, UNSPECIFIED LIGAMENT, INITIAL ENCOUNTER: Primary | ICD-10-CM

## 2017-04-30 PROCEDURE — 99283 EMERGENCY DEPT VISIT LOW MDM: CPT

## 2017-04-30 PROCEDURE — 73564 X-RAY EXAM KNEE 4 OR MORE: CPT

## 2017-04-30 PROCEDURE — 73610 X-RAY EXAM OF ANKLE: CPT

## 2017-04-30 NOTE — DISCHARGE INSTRUCTIONS
Ankle Sprain: Care Instructions  Your Care Instructions    An ankle sprain can happen when you twist your ankle. The ligaments that support the ankle can get stretched and torn. Often the ankle is swollen and painful. Ankle sprains may take from several weeks to several months to heal. Usually, the more pain and swelling you have, the more severe your ankle sprain is and the longer it will take to heal. You can heal faster and regain strength in your ankle with good home treatment. It is very important to give your ankle time to heal completely, so that you do not easily hurt your ankle again. Follow-up care is a key part of your treatment and safety. Be sure to make and go to all appointments, and call your doctor if you are having problems. It's also a good idea to know your test results and keep a list of the medicines you take. How can you care for yourself at home? · Prop up your foot on pillows as much as possible for the next 3 days. Try to keep your ankle above the level of your heart. This will help reduce the swelling. · Follow your doctor's directions for wearing a splint or elastic bandage. Wrapping the ankle may help reduce or prevent swelling. · Your doctor may give you a splint, a brace, an air stirrup, or another form of ankle support to protect your ankle until it is healed. Wear it as directed while your ankle is healing. Do not remove it unless your doctor tells you to. After your ankle has healed, ask your doctor whether you should wear the brace when you exercise. · Put ice or cold packs on your injured ankle for 10 to 20 minutes at a time. Try to do this every 1 to 2 hours for the next 3 days (when you are awake) or until the swelling goes down. Put a thin cloth between the ice and your skin. · You may need to use crutches until you can walk without pain. If you do use crutches, try to bear some weight on your injured ankle if you can do so without pain.  This helps the ankle heal.  · Take pain medicines exactly as directed. ¨ If the doctor gave you a prescription medicine for pain, take it as prescribed. ¨ If you are not taking a prescription pain medicine, ask your doctor if you can take an over-the-counter medicine. · If you have been given ankle exercises to do at home, do them exactly as instructed. These can promote healing and help prevent lasting weakness. When should you call for help? Call your doctor now or seek immediate medical care if:  · Your pain is getting worse. · Your swelling is getting worse. · Your splint feels too tight or you are unable to loosen it. Watch closely for changes in your health, and be sure to contact your doctor if:  · You are not getting better after 1 week. Where can you learn more? Go to http://carolyn-cullen.info/. Enter L338 in the search box to learn more about \"Ankle Sprain: Care Instructions. \"  Current as of: May 23, 2016  Content Version: 11.2  © 4915-1732 [employer]. Care instructions adapted under license by [employer] (which disclaims liability or warranty for this information). If you have questions about a medical condition or this instruction, always ask your healthcare professional. [place] [address] any warranty or liability for your use of this information. Knee Sprain: Care Instructions  Your Care Instructions    A knee sprain is one or more stretched, partly torn, or completely torn knee ligaments. Ligaments are bands of ropelike tissue that connect bone to bone and make the knee stable. The knee has four main ligaments. Knee sprains often happen because of a twisting or bending injury from sports such as skiing, basketball, soccer, or football. The knee turns one way while the lower or upper leg goes another way. A sprain also can happen when the knee is hit from the side or the front.   If a knee ligament is slightly stretched, you will probably need only home treatment. You may need a splint or brace (immobilizer) for a partly torn ligament. A complete tear may need surgery. A minor knee sprain may take up to 6 weeks to heal, while a severe sprain may take months. Follow-up care is a key part of your treatment and safety. Be sure to make and go to all appointments, and call your doctor if you are having problems. It's also a good idea to know your test results and keep a list of the medicines you take. How can you care for yourself at home? · Follow instructions about how much weight you can put on your leg and how to walk with crutches. · Prop up your leg on a pillow when you ice it or anytime you sit or lie down for the next 3 days. Try to keep it above the level of your heart. This will help reduce swelling. · Put ice or a cold pack on your knee for 10 to 20 minutes at a time. Try to do this every 1 to 2 hours for the next 3 days (when you are awake) or until the swelling goes down. Put a thin cloth between the ice and your skin. Do not get the splint wet. · If you have an elastic bandage, make sure it is snug but not so tight that your leg is numb, tingles, or swells below the bandage. You can loosen the bandage if it is too tight. · Your doctor may recommend a brace (immobilizer) to support your knee while it heals. Wear it as directed. · Ask your doctor if you can take an over-the-counter pain medicine, such as acetaminophen (Tylenol), ibuprofen (Advil, Motrin), or naproxen (Aleve). Be safe with medicines. Read and follow all instructions on the label. When should you call for help? Call 911 anytime you think you may need emergency care. For example, call if:  · You have sudden chest pain and shortness of breath, or you cough up blood. Call your doctor now or seek immediate medical care if:  · You have increased or severe pain. · You cannot move your toes or ankle. · Your foot is cool or pale or changes color.   · You have tingling, weakness, or numbness in your foot or leg. · Your splint or brace feels too tight. · You are unable to straighten the knee, or the knee \"locks. \"  · You have signs of a blood clot in your leg, such as:  ¨ Pain in your calf, back of the knee, thigh, or groin. ¨ Redness and swelling in your leg. Watch closely for changes in your health, and be sure to contact your doctor if:  · Your pain is not getting better or is getting worse. Where can you learn more? Go to http://carolyn-cullen.info/. Enter N406 in the search box to learn more about \"Knee Sprain: Care Instructions. \"  Current as of: May 23, 2016  Content Version: 11.2  © 3204-2020 [employer]. Care instructions adapted under license by [employer] (which disclaims liability or warranty for this information). If you have questions about a medical condition or this instruction, always ask your healthcare professional. [address] any warranty or liability for your use of this information.

## 2017-04-30 NOTE — ED TRIAGE NOTES
PT arrived via EMS, was walking and stepped in hole injuring left knee and ankle, no deformities noted, able to bear weight without difficulty

## 2017-04-30 NOTE — ED PROVIDER NOTES
HPI Comments: 44yo male presenting to ED with knee and ankle pain s/p tripping. Pt states he was walking out of store and foot got stuck in hole causing him to trip and fall. No head injury, no LOC. C/o right left knee and ankle pain. No other complaints. Denies headaches, dizziness, CP, SOB, neck pain, back pain, abdominal pain, NVD, urinary symptoms or any other symptoms at this time. Past Medical History:  No date: BPH (benign prostatic hypertrophy)  No date: Diabetes ([facility] [address]  1996: Heroin abuse      Comment: Stopped 3/2016  No date: Hypertension  No date: Prostatitis  No date: Sinus drainage  No date: Sinusitis nasal     Denies drug use. +smoking and etoh use. Patient is a 45 y.o. male presenting with knee pain and ankle problem. The history is provided by the patient. Knee Pain    Pertinent negatives include no back pain and no neck pain. Ankle Injury    Pertinent negatives include no back pain and no neck pain. Past Medical History:   Diagnosis Date    BPH (benign prostatic hypertrophy)     Diabetes ([facility] [address]     Heroin abuse 1996    Stopped 3/2016    Hypertension     Prostatitis     Sinus drainage     Sinusitis nasal        No past surgical history on file. Family History:   Problem Relation Age of Onset    Heart Disease Mother     Hypertension Mother     Diabetes Father     Cancer Father     Hypertension Sister        Social History     Social History    Marital status: LEGALLY      Spouse name: N/A    Number of children: N/A    Years of education: N/A     Occupational History    Not on file.      Social History Main Topics    Smoking status: Current Every Day Smoker     Packs/day: 0.50     Years: 20.00     Types: Cigarettes    Smokeless tobacco: Never Used      Comment: 4 cigarettes a day    Alcohol use 1.5 oz/week     3 Cans of beer per week      Comment: occassiionally    Drug use: No      Comment: last use 3/2016- entered drug program    Sexual activity: Yes     Partners: Female     Birth control/ protection: None     Other Topics Concern    Not on file     Social History Narrative         ALLERGIES: Pollen extracts    Review of Systems   Constitutional: Negative. Negative for chills and fever. Respiratory: Negative. Gastrointestinal: Negative. Genitourinary: Negative. Musculoskeletal: Positive for gait problem and joint swelling. Negative for arthralgias, back pain, myalgias, neck pain and neck stiffness. Neurological: Negative for dizziness, weakness, light-headedness and headaches. Vitals:    04/30/17 1610   BP: 134/83   Pulse: 95   Resp: 18   Temp: 97.4 °F (36.3 °C)   SpO2: 98%   Weight: 106.6 kg (235 lb)   Height: 6' 5\" (1.956 m)            Physical Exam   Constitutional: He is oriented to person, place, and time. He appears well-developed and well-nourished. HENT:   Head: Normocephalic and atraumatic. Mouth/Throat: Oropharynx is clear and moist.   Eyes: Conjunctivae are normal.   Neck: Normal range of motion. Cardiovascular: Normal rate, regular rhythm and normal heart sounds. Pulmonary/Chest: Effort normal. No respiratory distress. He has no wheezes. He has no rales. Abdominal: Soft. He exhibits no distension. Musculoskeletal:   Left knee TTP without laxity or pain produced with anterior/post drawer, gabe test.  No effusion, no swelling. No pain with ROM of ankle, pain with palpation to lateral ankle. No swelling. No deformity. Normal pedal pulses 2+. Normal cap refill, normal sensation   Neurological: He is oriented to person, place, and time. Skin: Skin is warm and dry. No erythema. Psychiatric: He has a normal mood and affect. Nursing note and vitals reviewed. MDM  Number of Diagnoses or Management Options  Diagnosis management comments: Xray prelim reading by self- negative for fracture or dislocation. Holly Dodson PA-C     Impression: ankle sprain, knee sprain.    D/c home with NSAIDS, rest and ortho f/u if needed       Amount and/or Complexity of Data Reviewed  Tests in the radiology section of CPT®: ordered and reviewed      ED Course       Procedures

## 2019-08-06 NOTE — PROGRESS NOTES
Transitions of Care follow up for Sprain of lumbar region and tobacco abuse. Unable to reach patient. MARIE closed.
36.4